# Patient Record
Sex: MALE | Race: WHITE | URBAN - METROPOLITAN AREA
[De-identification: names, ages, dates, MRNs, and addresses within clinical notes are randomized per-mention and may not be internally consistent; named-entity substitution may affect disease eponyms.]

---

## 2023-05-18 ENCOUNTER — OFFICE VISIT (OUTPATIENT)
Dept: URBAN - METROPOLITAN AREA CLINIC 15 | Facility: CLINIC | Age: 83
End: 2023-05-18
Payer: MEDICARE

## 2023-05-18 DIAGNOSIS — H20.012 PRIMARY IRIDOCYCLITIS, LEFT EYE: Primary | ICD-10-CM

## 2023-05-18 DIAGNOSIS — H18.232 SECONDARY CORNEAL EDEMA, LEFT EYE: ICD-10-CM

## 2023-05-18 DIAGNOSIS — Z98.890 OTHER SPECIFIED POSTPROCEDURAL STATES: ICD-10-CM

## 2023-05-18 PROCEDURE — 92004 COMPRE OPH EXAM NEW PT 1/>: CPT | Performed by: OPTOMETRIST

## 2023-05-18 RX ORDER — MOXIFLOXACIN 5 MG/ML
0.5 % SOLUTION/ DROPS OPHTHALMIC
Qty: 5 | Refills: 0 | Status: ACTIVE
Start: 2023-05-18

## 2023-05-18 RX ORDER — PREDNISOLONE ACETATE 10 MG/ML
1 % SUSPENSION/ DROPS OPHTHALMIC
Qty: 5 | Refills: 2 | Status: ACTIVE
Start: 2023-05-18

## 2023-05-18 ASSESSMENT — INTRAOCULAR PRESSURE
OD: 21
OS: 21

## 2023-05-18 NOTE — IMPRESSION/PLAN
Impression: Primary iridocyclitis, left eye: H20.012. Plan: Patient presents with sudden onset blurred vision OS, new patient with no records in hand. Hx of retinal detachment repair OU, most recent sx 3 yrs ago had PCIOL relocation - sewn in with outside provider. No evidence of macular edema. Patient has mild temporal corneal edema around temporal corneal suture. If no improvement with gtts will start Andree-128 OS. Patient denies pain, minimal ciliary flush. Will initiate topical Prednisolone Acetate Q3hrs OS and Moxifloxacin QID OS for cover. Advised patient to call triage if pain or vision worsens. F/U 3days or sooner if needed. Will consider referral to Dr. Jhonny Santiago if no improvement.

## 2023-05-18 NOTE — IMPRESSION/PLAN
Impression: Other specified postprocedural states: Z98.890. Plan: History of many retinal surgeries OU. Total of 4 RD's, first RD in OS roughly around 2013 with laser repair, the other 3 followed suit shortly behind. 1 RD in OD around 2010 Wife/patient are poor historians, will have patient obtain medical records from previous providers and bring them to next appointment.

## 2023-05-22 ENCOUNTER — OFFICE VISIT (OUTPATIENT)
Dept: URBAN - METROPOLITAN AREA CLINIC 15 | Facility: CLINIC | Age: 83
End: 2023-05-22
Payer: MEDICARE

## 2023-05-22 DIAGNOSIS — Z98.890 OTHER SPECIFIED POSTPROCEDURAL STATES: ICD-10-CM

## 2023-05-22 DIAGNOSIS — H20.012 PRIMARY IRIDOCYCLITIS, LEFT EYE: Primary | ICD-10-CM

## 2023-05-22 DIAGNOSIS — H18.232 SECONDARY CORNEAL EDEMA, LEFT EYE: ICD-10-CM

## 2023-05-22 PROCEDURE — 99213 OFFICE O/P EST LOW 20 MIN: CPT | Performed by: OPTOMETRIST

## 2023-05-22 ASSESSMENT — INTRAOCULAR PRESSURE
OS: 23
OD: 21

## 2023-05-22 NOTE — IMPRESSION/PLAN
Impression: Other specified postprocedural states: Z98.890. Plan: Patient/spouse brought in medical records or previous surgical eye procedures. Will scan information into patient chart.

## 2023-05-22 NOTE — IMPRESSION/PLAN
Impression: Primary iridocyclitis, left eye: H20.012. Plan: Improving. Continue Pred drops q3hrs OS for 10 days and Moxifloxcin BID OS. RTC in 10 days for F/U.

## 2023-06-05 ENCOUNTER — OFFICE VISIT (OUTPATIENT)
Dept: URBAN - METROPOLITAN AREA CLINIC 15 | Facility: CLINIC | Age: 83
End: 2023-06-05
Payer: MEDICARE

## 2023-06-05 ENCOUNTER — OFFICE VISIT (OUTPATIENT)
Dept: URBAN - METROPOLITAN AREA CLINIC 13 | Facility: CLINIC | Age: 83
End: 2023-06-05
Payer: MEDICARE

## 2023-06-05 DIAGNOSIS — H33.8 OTHER RETINAL DETACHMENT: Primary | ICD-10-CM

## 2023-06-05 DIAGNOSIS — H20.9 UVEITIS: ICD-10-CM

## 2023-06-05 DIAGNOSIS — Z98.890 OTHER SPECIFIED POSTPROCEDURAL STATES: ICD-10-CM

## 2023-06-05 DIAGNOSIS — H40.023 OPEN ANGLE WITH BORDERLINE FINDINGS, HIGH RISK, BILATERAL: ICD-10-CM

## 2023-06-05 DIAGNOSIS — H20.012 PRIMARY IRIDOCYCLITIS, LEFT EYE: Primary | ICD-10-CM

## 2023-06-05 DIAGNOSIS — Z96.1 PRESENCE OF INTRAOCULAR LENS: ICD-10-CM

## 2023-06-05 DIAGNOSIS — H18.232 SECONDARY CORNEAL EDEMA, LEFT EYE: ICD-10-CM

## 2023-06-05 PROCEDURE — 99213 OFFICE O/P EST LOW 20 MIN: CPT | Performed by: OPTOMETRIST

## 2023-06-05 PROCEDURE — 92134 CPTRZ OPH DX IMG PST SGM RTA: CPT | Performed by: OPHTHALMOLOGY

## 2023-06-05 PROCEDURE — 92133 CPTRZD OPH DX IMG PST SGM ON: CPT | Performed by: OPTOMETRIST

## 2023-06-05 PROCEDURE — 99202 OFFICE O/P NEW SF 15 MIN: CPT | Performed by: OPHTHALMOLOGY

## 2023-06-05 ASSESSMENT — INTRAOCULAR PRESSURE
OD: 19
OD: 19
OD: 13
OS: 17
OS: 16
OS: 17
OD: 13
OS: 16

## 2023-06-05 NOTE — IMPRESSION/PLAN
Impression: Presence of intraocular lens: Z96.1. Plan: OD: 3 piece lens - stable OS: sutured to iris - stable

## 2023-06-05 NOTE — IMPRESSION/PLAN
Impression: Other retinal detachment: H33.8. Bilateral.
OCT OU - no IRF/SRF OU  / 310 
s/p PPV/SB in 1997
s/p PPV/SB in 2013 x 2 Plan: Stable and well healed. No new tears, extension of break, or retinal detachment seen. No ERM/CME on exam.

Retinal detachment warnings given. Call ASAP with changes. 

6m OCT OU

## 2023-06-05 NOTE — IMPRESSION/PLAN
Impression: Open angle with borderline findings, high risk, bilateral: H40.023. Plan: IOP within normal range today. Will continue to monitor and consider testing.

## 2023-06-05 NOTE — IMPRESSION/PLAN
Impression: Primary iridocyclitis, left eye: H20.012. Plan: Improving. Start Pred-Taper QID x 2wks, TID x2 wks, BID x 2wks and QD x 2wks then stop.

## 2023-06-05 NOTE — IMPRESSION/PLAN
Impression: Other specified postprocedural states: Z98.890. Plan: Patient/spouse brought in updated medical records or previous surgical eye procedures. Will scan information into patient chart.

## 2023-06-05 NOTE — IMPRESSION/PLAN
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 Plan: Secondary to multiple surgeries and sutured IOL. Stable with no edema today. On PF TID for flare about 3 weeks ago. May benefit from maint therapy Rec taper 1 drop every 2 weeks and then d/c

## 2023-10-31 ENCOUNTER — OFFICE VISIT (OUTPATIENT)
Dept: URBAN - METROPOLITAN AREA CLINIC 52 | Facility: CLINIC | Age: 83
End: 2023-10-31
Payer: MEDICARE

## 2023-10-31 DIAGNOSIS — H40.023 OPEN ANGLE WITH BORDERLINE FINDINGS, HIGH RISK, BILATERAL: Primary | ICD-10-CM

## 2023-10-31 DIAGNOSIS — H20.9 UVEITIS: ICD-10-CM

## 2023-10-31 PROCEDURE — 99214 OFFICE O/P EST MOD 30 MIN: CPT | Performed by: OPTOMETRIST

## 2023-10-31 RX ORDER — PREDNISOLONE ACETATE 10 MG/ML
1 % SUSPENSION/ DROPS OPHTHALMIC
Qty: 5 | Refills: 2 | Status: ACTIVE
Start: 2023-10-31

## 2023-10-31 RX ORDER — BRIMONIDINE TARTRATE 2 MG/ML
0.2 % SOLUTION/ DROPS OPHTHALMIC
Qty: 5 | Refills: 3 | Status: ACTIVE
Start: 2023-10-31

## 2023-10-31 ASSESSMENT — INTRAOCULAR PRESSURE
OS: 17
OD: 20
OS: 16
OD: 22

## 2023-11-16 ENCOUNTER — OFFICE VISIT (OUTPATIENT)
Dept: URBAN - METROPOLITAN AREA CLINIC 15 | Facility: CLINIC | Age: 83
End: 2023-11-16
Payer: MEDICARE

## 2023-11-16 PROCEDURE — 99214 OFFICE O/P EST MOD 30 MIN: CPT | Performed by: OPTOMETRIST

## 2023-11-22 PROCEDURE — 99213 OFFICE O/P EST LOW 20 MIN: CPT | Performed by: OPHTHALMOLOGY

## 2023-11-22 PROCEDURE — 92134 CPTRZ OPH DX IMG PST SGM RTA: CPT | Performed by: OPHTHALMOLOGY

## 2023-11-22 RX ORDER — DIFLUPREDNATE OPHTHALMIC 0.5 MG/ML
0.05 % EMULSION OPHTHALMIC
Qty: 5 | Refills: 1 | Status: INACTIVE
Start: 2023-11-22 | End: 2024-02-02

## 2023-11-22 ASSESSMENT — INTRAOCULAR PRESSURE
OD: 18
OS: 18

## 2023-11-29 ENCOUNTER — OFFICE VISIT (OUTPATIENT)
Dept: URBAN - METROPOLITAN AREA CLINIC 13 | Facility: CLINIC | Age: 83
End: 2023-11-29
Payer: MEDICARE

## 2023-11-29 PROCEDURE — 92134 CPTRZ OPH DX IMG PST SGM RTA: CPT | Performed by: OPHTHALMOLOGY

## 2023-11-29 PROCEDURE — 99213 OFFICE O/P EST LOW 20 MIN: CPT | Performed by: OPHTHALMOLOGY

## 2023-11-29 ASSESSMENT — INTRAOCULAR PRESSURE
OD: 14
OS: 13

## 2023-12-20 ENCOUNTER — OFFICE VISIT (OUTPATIENT)
Dept: URBAN - METROPOLITAN AREA CLINIC 13 | Facility: CLINIC | Age: 83
End: 2023-12-20
Payer: MEDICARE

## 2023-12-20 DIAGNOSIS — H33.8 OTHER RETINAL DETACHMENT: ICD-10-CM

## 2023-12-20 PROCEDURE — 92134 CPTRZ OPH DX IMG PST SGM RTA: CPT | Performed by: OPHTHALMOLOGY

## 2023-12-20 PROCEDURE — 99213 OFFICE O/P EST LOW 20 MIN: CPT | Performed by: OPHTHALMOLOGY

## 2023-12-20 ASSESSMENT — INTRAOCULAR PRESSURE
OS: 19
OD: 18

## 2024-02-02 ENCOUNTER — OFFICE VISIT (OUTPATIENT)
Dept: URBAN - METROPOLITAN AREA CLINIC 13 | Facility: CLINIC | Age: 84
End: 2024-02-02
Payer: MEDICARE

## 2024-02-02 DIAGNOSIS — H20.9 UVEITIS: Primary | ICD-10-CM

## 2024-02-02 DIAGNOSIS — Z96.1 PRESENCE OF INTRAOCULAR LENS: ICD-10-CM

## 2024-02-02 PROCEDURE — 99214 OFFICE O/P EST MOD 30 MIN: CPT | Performed by: STUDENT IN AN ORGANIZED HEALTH CARE EDUCATION/TRAINING PROGRAM

## 2024-02-02 PROCEDURE — 92134 CPTRZ OPH DX IMG PST SGM RTA: CPT | Performed by: STUDENT IN AN ORGANIZED HEALTH CARE EDUCATION/TRAINING PROGRAM

## 2024-02-02 RX ORDER — DIFLUPREDNATE OPHTHALMIC 0.5 MG/ML
0.05 % EMULSION OPHTHALMIC
Qty: 10 | Refills: 6 | Status: ACTIVE
Start: 2024-02-02

## 2024-02-02 ASSESSMENT — INTRAOCULAR PRESSURE
OD: 18
OS: 20